# Patient Record
Sex: MALE
[De-identification: names, ages, dates, MRNs, and addresses within clinical notes are randomized per-mention and may not be internally consistent; named-entity substitution may affect disease eponyms.]

---

## 2019-07-29 ENCOUNTER — TRANSCRIPTION ENCOUNTER (OUTPATIENT)
Age: 19
End: 2019-07-29

## 2021-03-18 ENCOUNTER — APPOINTMENT (OUTPATIENT)
Dept: PEDIATRIC ALLERGY IMMUNOLOGY | Facility: CLINIC | Age: 21
End: 2021-03-18

## 2021-03-18 DIAGNOSIS — J30.9 ALLERGIC RHINITIS, UNSPECIFIED: ICD-10-CM

## 2021-03-18 RX ORDER — CETIRIZINE HYDROCHLORIDE 10 MG/1
10 TABLET, COATED ORAL
Qty: 30 | Refills: 3 | Status: ACTIVE | COMMUNITY
Start: 2021-03-18 | End: 1900-01-01

## 2021-03-18 RX ORDER — FLUTICASONE PROPIONATE 50 UG/1
50 SPRAY, METERED NASAL DAILY
Qty: 1 | Refills: 3 | Status: ACTIVE | COMMUNITY
Start: 2021-03-18 | End: 1900-01-01

## 2021-03-18 RX ORDER — MONTELUKAST 10 MG/1
10 TABLET, FILM COATED ORAL DAILY
Qty: 30 | Refills: 3 | Status: ACTIVE | COMMUNITY
Start: 2021-03-18 | End: 1900-01-01

## 2021-03-22 RX ORDER — FLUTICASONE PROPIONATE 50 UG/1
50 SPRAY, METERED NASAL DAILY
Qty: 1 | Refills: 2 | Status: ACTIVE | COMMUNITY
Start: 2021-03-22 | End: 1900-01-01

## 2021-09-09 ENCOUNTER — OUTPATIENT (OUTPATIENT)
Dept: OUTPATIENT SERVICES | Facility: HOSPITAL | Age: 21
LOS: 1 days | End: 2021-09-09

## 2021-09-09 ENCOUNTER — APPOINTMENT (OUTPATIENT)
Dept: CT IMAGING | Facility: CLINIC | Age: 21
End: 2021-09-09
Payer: COMMERCIAL

## 2021-09-09 PROCEDURE — 74177 CT ABD & PELVIS W/CONTRAST: CPT | Mod: 26

## 2024-03-26 ENCOUNTER — APPOINTMENT (OUTPATIENT)
Dept: OTOLARYNGOLOGY | Facility: CLINIC | Age: 24
End: 2024-03-26
Payer: COMMERCIAL

## 2024-03-26 DIAGNOSIS — J32.9 CHRONIC SINUSITIS, UNSPECIFIED: ICD-10-CM

## 2024-03-26 DIAGNOSIS — J34.2 DEVIATED NASAL SEPTUM: ICD-10-CM

## 2024-03-26 DIAGNOSIS — J34.3 HYPERTROPHY OF NASAL TURBINATES: ICD-10-CM

## 2024-03-26 DIAGNOSIS — J32.8 OTHER CHRONIC SINUSITIS: ICD-10-CM

## 2024-03-26 PROCEDURE — 31231 NASAL ENDOSCOPY DX: CPT | Mod: 52

## 2024-03-26 PROCEDURE — 99204 OFFICE O/P NEW MOD 45 MIN: CPT | Mod: 25

## 2024-03-26 PROCEDURE — 99203 OFFICE O/P NEW LOW 30 MIN: CPT | Mod: 25

## 2024-03-27 PROBLEM — J32.9 OTHER SINUSITIS: Status: ACTIVE | Noted: 2024-03-27

## 2024-03-27 PROBLEM — J34.3 HYPERTROPHY OF BOTH INFERIOR NASAL TURBINATES: Status: ACTIVE | Noted: 2024-03-27

## 2024-03-27 PROBLEM — J34.2 DEVIATED NASAL SEPTUM: Status: ACTIVE | Noted: 2024-03-27

## 2024-03-27 PROBLEM — J32.8 OTHER CHRONIC SINUSITIS: Status: ACTIVE | Noted: 2024-03-27

## 2024-03-27 NOTE — CONSULT LETTER
[Dear  ___] : Dear  [unfilled], [Courtesy Letter:] : I had the pleasure of seeing your patient, [unfilled], in my office today. [Consult Closing:] : Thank you very much for allowing me to participate in the care of this patient.  If you have any questions, please do not hesitate to contact me. [Sincerely,] : Sincerely, [FreeTextEntry3] : Carmelo Jiménez MD Department of Otolaryngology, Head and Neck Surgery

## 2024-03-27 NOTE — DATA REVIEWED
[de-identified] : CT Sinus 3/11/24: Findings: The paranasal sinuses are well developed throughout. There is a rind of mucosal inflammatory thickening lining the right maxillary antrum with maximum thickness of 3 mm. There is opacification of the right ostiomeatal unit with sclerosis of the uncinate process. There is a patent drainage pathway from the antrum to the nasal cavity posterior to the ostiomeatal unit. Clinical correlation as to whether or not this represents an accessory ostium is postsurgical in nature is recommended. Extensive polypoid mucosal inflammatory thickening lines the left sphenoid sinus with maximum thickness of 8 mm. Fluid layers dependently in the left maxillary antrum. There is opacification of the left ostiomeatal unit with sclerosis of the left uncinate process. Mucosal inflammatory thickening is demonstrated throughout anterior and posterior ethmoid air cells bilaterally. Scant mucosal thickening extends through the frontal recesses into the inferior frontal sinuses bilaterally with opacification of the frontal recesses bilaterally. Is mucosal inflammatory thickening demonstrated in the sphenoid sinuses bilaterally, right side greater than left without fluid. There is mucosal opacification of the sphenoethmoidal recesses bilaterally. The lamina papyracea, fovea ethmoidalis, and cribriform plate are intact. The fovea ethmoidalis are symmetric bilaterally. There is deviation nasal septum toward the right with a right septal spur impinging on the right inferior turbinate. There is opacification of the left middle meatus. The osseous structures of the skull base and their foramina are intact. The orbits, nasopharynx, and visualized intracranial structures are unremarkable. Patient: KATIE DELEON : 2000 Exam Date: 3/11/2024 Acc No: [id number] MRN: [id number] IMPRESSION Mucosal inflammatory thickening in the maxillary antra bilaterally, bilateral anterior and posterior ethmoid air cells, the inferior frontal sinuses bilaterally, and the sphenoid sinuses bilaterally. This is most pronounced in the left maxillary antrum where there is associated fluid layering dependently. There is opacification of the ostiomeatal units, frontal recesses, sphenoethmoidal recesses bilaterally as well as left middle meatus. There is widely patent drainage pathway from the right maxillary antrum to the nasal cavity posterior to the ostiomeatal unit. Clinical correlation is advised as to whether or not this is postsurgical or represents an accessory ostium. Deviation of the nasal septum toward the right with a right septal spur impinging on the right inferior turbinate. Electronically Signed by MARTA

## 2024-03-27 NOTE — PROCEDURE
[FreeTextEntry3] : Nasal Endoscopy: -inferior turbinate hypertrophy -tight nasal cavities -polypoid edema left MT w thin clear/white drainage -choana clear
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
## 2025-01-07 ENCOUNTER — NON-APPOINTMENT (OUTPATIENT)
Age: 25
End: 2025-01-07

## 2025-01-07 ENCOUNTER — APPOINTMENT (OUTPATIENT)
Dept: OTOLARYNGOLOGY | Facility: CLINIC | Age: 25
End: 2025-01-07
Payer: COMMERCIAL

## 2025-01-07 VITALS — BODY MASS INDEX: 29.4 KG/M2 | HEIGHT: 71 IN | WEIGHT: 210 LBS

## 2025-01-07 DIAGNOSIS — J34.2 DEVIATED NASAL SEPTUM: ICD-10-CM

## 2025-01-07 DIAGNOSIS — J32.8 OTHER CHRONIC SINUSITIS: ICD-10-CM

## 2025-01-07 DIAGNOSIS — J34.3 HYPERTROPHY OF NASAL TURBINATES: ICD-10-CM

## 2025-01-07 PROCEDURE — 99214 OFFICE O/P EST MOD 30 MIN: CPT | Mod: 25

## 2025-01-07 PROCEDURE — 31231 NASAL ENDOSCOPY DX: CPT | Mod: 52

## 2025-01-08 RX ORDER — FLUTICASONE PROPIONATE 50 UG/1
50 SPRAY, METERED NASAL DAILY
Qty: 1 | Refills: 5 | Status: ACTIVE | COMMUNITY
Start: 2025-01-08 | End: 1900-01-01

## 2025-01-08 RX ORDER — CHLORHEXIDINE GLUCONATE, 0.12% ORAL RINSE 1.2 MG/ML
0.12 SOLUTION DENTAL
Qty: 1 | Refills: 0 | Status: ACTIVE | COMMUNITY
Start: 2025-01-08 | End: 1900-01-01

## 2025-03-10 ENCOUNTER — APPOINTMENT (OUTPATIENT)
Dept: OTOLARYNGOLOGY | Facility: HOSPITAL | Age: 25
End: 2025-03-10